# Patient Record
Sex: FEMALE | Race: WHITE | URBAN - METROPOLITAN AREA
[De-identification: names, ages, dates, MRNs, and addresses within clinical notes are randomized per-mention and may not be internally consistent; named-entity substitution may affect disease eponyms.]

---

## 2017-07-14 ENCOUNTER — IMPORTED ENCOUNTER (OUTPATIENT)
Dept: URBAN - METROPOLITAN AREA CLINIC 9 | Facility: CLINIC | Age: 57
End: 2017-07-14

## 2018-05-24 ENCOUNTER — IMPORTED ENCOUNTER (OUTPATIENT)
Dept: URBAN - METROPOLITAN AREA CLINIC 9 | Facility: CLINIC | Age: 58
End: 2018-05-24

## 2018-06-26 ENCOUNTER — IMPORTED ENCOUNTER (OUTPATIENT)
Dept: URBAN - METROPOLITAN AREA CLINIC 9 | Facility: CLINIC | Age: 58
End: 2018-06-26

## 2018-10-22 ENCOUNTER — IMPORTED ENCOUNTER (OUTPATIENT)
Dept: URBAN - METROPOLITAN AREA CLINIC 9 | Facility: CLINIC | Age: 58
End: 2018-10-22

## 2019-10-11 ENCOUNTER — IMPORTED ENCOUNTER (OUTPATIENT)
Dept: URBAN - METROPOLITAN AREA CLINIC 9 | Facility: CLINIC | Age: 59
End: 2019-10-11

## 2020-01-02 ENCOUNTER — IMPORTED ENCOUNTER (OUTPATIENT)
Dept: URBAN - METROPOLITAN AREA CLINIC 9 | Facility: CLINIC | Age: 60
End: 2020-01-02

## 2020-04-30 ENCOUNTER — IMPORTED ENCOUNTER (OUTPATIENT)
Dept: URBAN - METROPOLITAN AREA CLINIC 9 | Facility: CLINIC | Age: 60
End: 2020-04-30

## 2020-07-30 ENCOUNTER — IMPORTED ENCOUNTER (OUTPATIENT)
Dept: URBAN - METROPOLITAN AREA CLINIC 9 | Facility: CLINIC | Age: 60
End: 2020-07-30

## 2020-12-23 ENCOUNTER — IMPORTED ENCOUNTER (OUTPATIENT)
Dept: URBAN - METROPOLITAN AREA CLINIC 9 | Facility: CLINIC | Age: 60
End: 2020-12-23

## 2020-12-31 ENCOUNTER — IMPORTED ENCOUNTER (OUTPATIENT)
Dept: URBAN - METROPOLITAN AREA CLINIC 9 | Facility: CLINIC | Age: 60
End: 2020-12-31

## 2021-10-18 ASSESSMENT — VISUAL ACUITY
OS_CC: 20/30 - SN
OS_CC: 20/20 SN
OD_SC: 20/70 SN
OD_PH: 20/60 SN
OS_SC: 20/100 - SN
OS_CC: 20/25 SN
OD_CC: 20/25 - SN
OD_CC: 20/200 SN
OS_SC: 20/60 - SN
OD_CC: 20/25 SN
OD_SC: 20/200 SN
OD_CC: 20/30 + SN
OD_SC: 20/400 SN
OD_CC: 20/25 - SN
OD_SC: 20/80 SN
OS_SC: 20/50 SN
OD_CC: 20/25 SN
OS_SC: 20/200 SN
OD_SC: 20/100 SN
OD_SC: 20/70 SN
OS_CC: 20/25 - SN
OS_CC: 20/50 -2 SN
OD_SC: 20/100 - SN
OS_SC: 20/100 SN
OD_SC: 20/40 -2 SN
OS_SC: 20/100 SN
OS_SC: 20/80 SN
OS_CC: 20/25 + SN
OS_SC: 20/200 SN
OD_SC: 20/30 SN
OS_SC: 20/25 SN

## 2021-10-18 ASSESSMENT — TONOMETRY
OS_IOP_MMHG: 13
OS_IOP_MMHG: 13
OS_IOP_MMHG: 17
OD_IOP_MMHG: 14
OS_IOP_MMHG: 14
OD_IOP_MMHG: 13
OD_IOP_MMHG: 14
OD_IOP_MMHG: 13
OS_IOP_MMHG: 14
OD_IOP_MMHG: 15
OD_IOP_MMHG: 14
OD_IOP_MMHG: 14
OD_IOP_MMHG: 15
OD_IOP_MMHG: 13
OS_IOP_MMHG: 14
OS_IOP_MMHG: 13
OS_IOP_MMHG: 13
OS_IOP_MMHG: 15

## 2022-12-27 NOTE — PATIENT DISCUSSION
Monitor. Patient states that she woke up this morning with a frontal headache with bilateral ear pain. She states that she was recently treated with a left ear infection and completed azithromycin course 2 days ago. She denies any fevers, sore throat, or vision changes. She took Tylenol for pain with no relief. She rates her pain 9/10.